# Patient Record
Sex: MALE | Race: WHITE | URBAN - METROPOLITAN AREA
[De-identification: names, ages, dates, MRNs, and addresses within clinical notes are randomized per-mention and may not be internally consistent; named-entity substitution may affect disease eponyms.]

---

## 2022-08-05 ENCOUNTER — HOSPITAL ENCOUNTER (EMERGENCY)
Facility: MEDICAL CENTER | Age: 32
End: 2022-08-06

## 2022-08-05 PROCEDURE — 302449 STATCHG TRIAGE ONLY (STATISTIC)

## 2022-08-06 VITALS
RESPIRATION RATE: 18 BRPM | BODY MASS INDEX: 21.13 KG/M2 | HEART RATE: 67 BPM | HEIGHT: 69 IN | OXYGEN SATURATION: 99 % | WEIGHT: 142.64 LBS | SYSTOLIC BLOOD PRESSURE: 109 MMHG | TEMPERATURE: 97.9 F | DIASTOLIC BLOOD PRESSURE: 67 MMHG

## 2022-08-06 NOTE — ED TRIAGE NOTES
Cristino Sung  31 y.o. male  Chief Complaint   Patient presents with   • Groin Pain     Pt states he lifts heavy things all day at work with no specific event or injury. Pt came home at 1600, then the pain began at approx 2000. Pt states its primarily left sided groin pain with radiation to the left inner thigh and low back.        Vitals:    08/05/22 2206   BP: 124/75   Pulse: (!) 59   Resp: 18   Temp: 36.2 °C (97.1 °F)   SpO2: 97%       Patient educated on triage process and encouraged to alert staff of any changes in condition.    Pt ambulatory to triage with the above complaint. Pt states the pain is 10/10, writhing in pain in triage room.

## 2022-08-06 NOTE — ED NOTES
"RV @ 0017  Patient states \"I'm okay, I guess. I just wish I can be seen so I can go home, it has already been two hours.\"   Explained triage process and apologized for wait times.     "

## 2022-08-06 NOTE — ED NOTES
Patient approached staff requesting to leave AMA. Patient informed of risks associated with leaving before being seen by a physician. Pt instructed to return to Emergency Department or call 911 if their condition worsens. Patient verbalized understanding and signed AMA form. Will dismiss.